# Patient Record
Sex: FEMALE | Race: WHITE | NOT HISPANIC OR LATINO | Employment: STUDENT | URBAN - METROPOLITAN AREA
[De-identification: names, ages, dates, MRNs, and addresses within clinical notes are randomized per-mention and may not be internally consistent; named-entity substitution may affect disease eponyms.]

---

## 2018-11-11 ENCOUNTER — TRANSFERRED RECORDS (OUTPATIENT)
Dept: HEALTH INFORMATION MANAGEMENT | Facility: CLINIC | Age: 23
End: 2018-11-11

## 2019-05-27 ENCOUNTER — APPOINTMENT (OUTPATIENT)
Dept: GENERAL RADIOLOGY | Facility: CLINIC | Age: 24
End: 2019-05-27
Attending: EMERGENCY MEDICINE
Payer: COMMERCIAL

## 2019-05-27 ENCOUNTER — HOSPITAL ENCOUNTER (EMERGENCY)
Facility: CLINIC | Age: 24
Discharge: HOME | End: 2019-05-27
Attending: EMERGENCY MEDICINE | Admitting: EMERGENCY MEDICINE
Payer: COMMERCIAL

## 2019-05-27 VITALS
HEIGHT: 64 IN | HEART RATE: 93 BPM | DIASTOLIC BLOOD PRESSURE: 90 MMHG | OXYGEN SATURATION: 100 % | RESPIRATION RATE: 16 BRPM | SYSTOLIC BLOOD PRESSURE: 128 MMHG | TEMPERATURE: 99.7 F | WEIGHT: 150 LBS | BODY MASS INDEX: 25.61 KG/M2

## 2019-05-27 DIAGNOSIS — S93.401A SPRAIN OF RIGHT ANKLE, UNSPECIFIED LIGAMENT, INITIAL ENCOUNTER: ICD-10-CM

## 2019-05-27 DIAGNOSIS — S93.601A SPRAIN OF RIGHT FOOT, INITIAL ENCOUNTER: ICD-10-CM

## 2019-05-27 PROCEDURE — 73630 X-RAY EXAM OF FOOT: CPT | Mod: RT

## 2019-05-27 PROCEDURE — 73610 X-RAY EXAM OF ANKLE: CPT | Mod: RT

## 2019-05-27 PROCEDURE — 29515 APPLICATION SHORT LEG SPLINT: CPT | Mod: RT

## 2019-05-27 PROCEDURE — 99284 EMERGENCY DEPT VISIT MOD MDM: CPT | Mod: 25

## 2019-05-27 PROCEDURE — 25000132 ZZH RX MED GY IP 250 OP 250 PS 637: Performed by: EMERGENCY MEDICINE

## 2019-05-27 RX ORDER — IBUPROFEN 600 MG/1
600 TABLET, FILM COATED ORAL ONCE
Status: COMPLETED | OUTPATIENT
Start: 2019-05-27 | End: 2019-05-27

## 2019-05-27 RX ADMIN — IBUPROFEN 600 MG: 600 TABLET ORAL at 12:52

## 2019-05-27 ASSESSMENT — MIFFLIN-ST. JEOR: SCORE: 1420.4

## 2019-05-27 ASSESSMENT — ENCOUNTER SYMPTOMS: ARTHRALGIAS: 1
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
## 2019-05-27 NOTE — ED AVS SNAPSHOT
Emergency Department  [address] [place] [address]  Phone:  [phone]  Fax:  [phone]                                    Josh Rodriguez   MRN: [id number]    Department:   Emergency Department   Date of Visit:  5/27/2019           After Visit Summary Signature Page    I have received my discharge instructions, and my questions have been answered. I have discussed any challenges I see with this plan with the nurse or doctor.    ..........................................................................................................................................  Patient/Patient Representative Signature      ..........................................................................................................................................  Patient Representative Print Name and Relationship to Patient    ..................................................               ................................................  Date                                   Time    ..........................................................................................................................................  Reviewed by Signature/Title    ...................................................              ..............................................  Date                                               Time          22EPIC Rev 08/18
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
## 2021-04-29 ENCOUNTER — TRANSFERRED RECORDS (OUTPATIENT)
Dept: HEALTH INFORMATION MANAGEMENT | Facility: CLINIC | Age: 26
End: 2021-04-29
Payer: COMMERCIAL

## 2021-12-05 ENCOUNTER — HEALTH MAINTENANCE LETTER (OUTPATIENT)
Age: 26
End: 2021-12-05

## 2021-12-07 ASSESSMENT — ANXIETY QUESTIONNAIRES
3. WORRYING TOO MUCH ABOUT DIFFERENT THINGS: NEARLY EVERY DAY
7. FEELING AFRAID AS IF SOMETHING AWFUL MIGHT HAPPEN: MORE THAN HALF THE DAYS
GAD7 TOTAL SCORE: 18
5. BEING SO RESTLESS THAT IT IS HARD TO SIT STILL: MORE THAN HALF THE DAYS
4. TROUBLE RELAXING: NEARLY EVERY DAY
2. NOT BEING ABLE TO STOP OR CONTROL WORRYING: MORE THAN HALF THE DAYS
GAD7 TOTAL SCORE: 18
1. FEELING NERVOUS, ANXIOUS, OR ON EDGE: NEARLY EVERY DAY
8. IF YOU CHECKED OFF ANY PROBLEMS, HOW DIFFICULT HAVE THESE MADE IT FOR YOU TO DO YOUR WORK, TAKE CARE OF THINGS AT HOME, OR GET ALONG WITH OTHER PEOPLE?: VERY DIFFICULT
6. BECOMING EASILY ANNOYED OR IRRITABLE: NEARLY EVERY DAY
GAD7 TOTAL SCORE: 18
7. FEELING AFRAID AS IF SOMETHING AWFUL MIGHT HAPPEN: MORE THAN HALF THE DAYS

## 2021-12-07 ASSESSMENT — ENCOUNTER SYMPTOMS
BLOOD IN STOOL: 0
WEIGHT LOSS: 0
CHILLS: 0
DEPRESSION: 1
INSOMNIA: 1
HEARTBURN: 0
NERVOUS/ANXIOUS: 1
JAUNDICE: 0
NIGHT SWEATS: 1
SORE THROAT: 0
INCREASED ENERGY: 1
ABDOMINAL PAIN: 0
WEIGHT GAIN: 1
BLOATING: 1
POLYDIPSIA: 0
POLYPHAGIA: 1
NAIL CHANGES: 0
NAUSEA: 0
DECREASED APPETITE: 0
DECREASED CONCENTRATION: 1
SKIN CHANGES: 0
POOR WOUND HEALING: 0
SINUS CONGESTION: 0
DIARRHEA: 1
BOWEL INCONTINENCE: 0
ALTERED TEMPERATURE REGULATION: 0
VOMITING: 0
PANIC: 1
HOARSE VOICE: 0
FEVER: 0
TROUBLE SWALLOWING: 0
RECTAL PAIN: 1
DECREASED LIBIDO: 1
HOT FLASHES: 1
SINUS PAIN: 0
NECK MASS: 0
SMELL DISTURBANCE: 0
CONSTIPATION: 1
FATIGUE: 1
HALLUCINATIONS: 0
TASTE DISTURBANCE: 0

## 2021-12-19 NOTE — PROGRESS NOTES
REASON for VISIT  establish care/     HPI   Josh Rodriguez is a 25 year old female who is here to establish care. . Has IUD - 9/19/2018  Has Kyleena - no menses since then - occasional spotting - Gr0 -IUD good for 5 yrs.  Sexually active - single partner - Pap was 2017 - no hx of STI's No vaginal discharge or itching    does occasionally get severe sharp pain  In the perineum - maybe every 3 months - on and off for a day - almost like a period cramp - - using ibuprofen  and helps - happens with spotting  - has an IUD     Menarche around age 10 - had very irreg menses - then went on BCP for several years and then changed to IUD.     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  Moved here in July  - using hydoxyzine for sleep and anxiety -     Hx of night sweats - sometimes changes pajamas 2-3 times/night   'FHx factor 5 Leiden disorder  - her sister, father p cousin and PGM are +for factor 5 Leiden disorder      Past Medical History:   Diagnosis Date     Breast disorder 01/2014    Benign mass biopsies in right breast     TRACEY (generalized anxiety disorder)      Migraines 2005     Moderate episode of recurrent major depressive disorder (H)      Seasonal allergic rhinitis          Current Outpatient Medications   Medication Sig Dispense Refill     citalopram (CELEXA) 10 MG tablet Take 10 mg by mouth daily       fluticasone (FLONASE) 50 MCG/ACT nasal spray Spray 1 spray into both nostrils daily as needed for rhinitis or allergies       hydrOXYzine (ATARAX) 25 MG tablet Take 25 mg by mouth 3 times daily as needed for anxiety         Social History     Tobacco Use     Smoking  status: Never Smoker     Smokeless tobacco: Never Used   Vaping Use     Vaping Use: Never used   Substance Use Topics     Alcohol use: Yes     Comment: socially     Drug use: Never       Family History   Problem Relation Age of Onset     Breast Cancer Mother         Triple negative, stage 3, age 40     Osteoporosis Mother      Genetic Disorder Father         Factor IV Leiden     Anxiety Disorder Sister      Genetic Disorder Sister         Factor IV Leiden     Other Cancer Brother         Skin, stage 1     Breast Cancer Maternal Grandmother         ER+, HER2-, age 70     Osteoporosis Maternal Grandmother      Genetic Disorder Paternal Grandmother         Factor IV Leiden     Depression Paternal Grandfather      Genetic Disorder Cousin         Paternal, Factor IV Leiden and PCOS          Review Of Systems  Answers for HPI/ROS submitted by the patient on 12/7/2021  TRACEY 7 TOTAL SCORE: 18  General Symptoms: Yes  Skin Symptoms: Yes  HENT Symptoms: Yes  EYE SYMPTOMS: No  HEART SYMPTOMS: No  LUNG SYMPTOMS: No  INTESTINAL SYMPTOMS: Yes  URINARY SYMPTOMS: No  GYNECOLOGIC SYMPTOMS: Yes  BREAST SYMPTOMS: No  SKELETAL SYMPTOMS: No  BLOOD SYMPTOMS: No  NERVOUS SYSTEM SYMPTOMS: No  MENTAL HEALTH SYMPTOMS: Yes  Ear pain: No  Ear discharge: No  Hearing loss: No  Tinnitus: No  Nosebleeds: No  Congestion: No  Sinus pain: No  Trouble swallowing: No   Voice hoarseness: No  Mouth sores: No  Sore throat: No  Tooth pain: No  Gum tenderness: No  Bleeding gums: No  Change in taste: No  Change in sense of smell: No  Dry mouth: Yes  Hearing aid used: No  Neck lump: No  Fever: No  Loss of appetite: No  Weight loss: No  Weight gain: Yes  Fatigue: Yes  Night sweats: Yes  Chills: No  Increased stress: No  Excessive hunger: Yes  Excessive thirst: No  Feeling hot or cold when others believe the temperature is normal: No  Loss of height: No  Post-operative complications: No  Surgical site pain: No  Hallucinations: No  Change in or Loss of Energy:  "Yes  Hyperactivity: No  Confusion: No  Changes in hair: No  Changes in moles/birth marks: No  Itching: Yes  Rashes: No  Changes in nails: No  Acne: Yes  Hair in places you don't want it: Yes  Change in facial hair: No  Warts: No  Non-healing sores: No  Scarring: No  Flaking of skin: No  Color changes of hands/feet in cold : No  Sun sensitivity: No  Skin thickening: No  Heart burn or indigestion: No  Nausea: No  Vomiting: No  Abdominal pain: No  Bloating: Yes  Constipation: Yes  Diarrhea: Yes  Blood in stool: No  Black stools: No  Rectal or Anal pain: Yes  Fecal incontinence: No  Yellowing of skin or eyes: No  Vomit with blood: No  Change in stools: No  Bleeding or spotting between periods: No  Heavy or painful periods: Yes  Irregular periods: No  Vaginal discharge: No  Hot flashes: Yes  Vaginal dryness: No  Genital ulcers: No  Reduced libido: Yes  Painful intercourse: No  Difficulty with sexual arousal: Yes  Post-menopausal bleeding: No  Nervous or Anxious: Yes  Depression: Yes  Trouble sleeping: Yes  Trouble thinking or concentrating: Yes  Mood changes: Yes  Panic attacks: Yes      OBJECTIVE:  /70 (BP Location: Right arm, Patient Position: Chair)   Pulse 102   Ht 1.626 m (5' 4\")   Wt 93.8 kg (206 lb 12.8 oz)   Breastfeeding No   BMI 35.50 kg/m    Gen:  young woman in NAD  HEENT: PERRL fundi red reflex  Ears clear with good light reflex.  OP mask   Neck  Supple.  Thyroid not palpable  No carotid bruits.  No LAD  CVS exam: S1, S2 normal, no murmur, click, rub or gallop, regular rate and rhythm, chest is clear without rales or wheezing, no pedal edema, no JVD, no hepatosplenomegaly.  Abd Soft ND NT  BS active  No masses or HSM  Ext   Good pulses. No edema  Musculoskeletal: spine is straight, extremities full ROM, no deformity  BREAST:  normal without suspicious masses, skin changes or axillary nodes, symmetric fibrous changes in both upper outer quadrants   Pelvic exam: normal vagina and vulva, normal " cervix without lesions or tenderness,pelvic somewhat limited because pf body habitus,  uterus normal size anteverted, adenexa  without tenderness, pap smear done.  Rectal exam: not done  Neuro: Neurological exam reveals normal without focal findings, mental status, speech normal, alert and oriented x iii, KRISTI, cranial nerves 2-12 intact, muscle tone and strength normal and symmetric, reflexes normal and symmetric  .      ASSESSMENT:young female with hx of anxiety, night sweats, excessive hair growth who comes in to establish care and for a preventative exam.      (Z76.89) Encounter to establish care  (primary encounter diagnosis)  Comment: pap due - she will get back on immunizations record - will get a lipid - BP is borderline systolic - follow   Plan: Lipid Panel, Obtaining, preparing and         conveyance of cervical or vaginal smear to         laboratory., Pap thin layer screen reflex to         HPV if ASCUS - recommended age 25 - 29 years            (F41.1) TRACEY (generalized anxiety disorder)  Comment: TRACEY = 19 - moderate will be titrating citalopram down and prozac up   Plan: FLUoxetine (PROZAC) 10 MG tablet        F/u 1 month         (Z11.3) Routine screening for STI (sexually transmitted infection)  Comment: wants STI testing  Plan: HIV Antigen Antibody Combo, Chlamydia         trachomatis PCR (Clinic Collect), Neisseria         gonorrhoeae PCR, CANCELED: Chlamydia         trachomatis PCR (Clinic Collect), CANCELED:         Neisseria gonorrhoeae PCR            (F33.1) Moderate episode of recurrent major depressive disorder (H)  Comment: PHQ 9 is 17 - no harmful thoughts - says she doesn't feel depressed  Plan: starting prozac again - felt better on this     (L68.9) Excessive hair growth  Comment: possible has PCOS - will get TVUS - if has PCOS would consider adding BCP but has strong FHx factor V Leiden deficiency so couldn't use BCP but will see what test shows.   Plan: Testosterone,  Total, Testosterone          Bioavailable, US Transvaginal Non OB            (R10.2) Pelvic cramping  Comment: would question if this is related to the IUD - does occur with spotting - ? If uterus trying to expel the IUD  Plan: follow    (Z83.2) Family history of factor V deficiency  Comment: prevalent in the FHx - will test - will limit use of OC but has been on BCP in the past   Plan: Factor 5 leiden mutation analysis            (R61) Night sweats  Comment: unclear etiology - suggests vasomotor instability -unlikely lymphoma, possibly TB, will check CBC and TSH and LDH and quantiferon   will check some labs - could possibly be sleep apnea.  Will be getting US   Plan: TSH - Reflex to FT4, CBC with Platelets         Differential, Comprehensive metabolic panel            Daiana Arceo MD, PhD    Time note ((n5, 60'): The total time (on the date of service) for this service was 60 minutes, including discussion/face-to-face, chart review, interpretation not otherwise reported, documentation, and updating of the computerized record.

## 2021-12-20 ENCOUNTER — LAB (OUTPATIENT)
Dept: LAB | Facility: CLINIC | Age: 26
End: 2021-12-20
Attending: FAMILY MEDICINE
Payer: COMMERCIAL

## 2021-12-20 ENCOUNTER — OFFICE VISIT (OUTPATIENT)
Dept: FAMILY MEDICINE | Facility: CLINIC | Age: 26
End: 2021-12-20
Attending: FAMILY MEDICINE
Payer: COMMERCIAL

## 2021-12-20 VITALS
HEIGHT: 64 IN | WEIGHT: 206.8 LBS | SYSTOLIC BLOOD PRESSURE: 130 MMHG | HEART RATE: 102 BPM | DIASTOLIC BLOOD PRESSURE: 70 MMHG | BODY MASS INDEX: 35.3 KG/M2

## 2021-12-20 DIAGNOSIS — Z00.00 ANNUAL PHYSICAL EXAM: ICD-10-CM

## 2021-12-20 DIAGNOSIS — R61 NIGHT SWEATS: ICD-10-CM

## 2021-12-20 DIAGNOSIS — L68.9 EXCESSIVE HAIR GROWTH: ICD-10-CM

## 2021-12-20 DIAGNOSIS — Z83.2 FAMILY HISTORY OF FACTOR V DEFICIENCY: ICD-10-CM

## 2021-12-20 DIAGNOSIS — Z11.3 ROUTINE SCREENING FOR STI (SEXUALLY TRANSMITTED INFECTION): ICD-10-CM

## 2021-12-20 DIAGNOSIS — F33.1 MODERATE EPISODE OF RECURRENT MAJOR DEPRESSIVE DISORDER (H): ICD-10-CM

## 2021-12-20 DIAGNOSIS — R10.2 PELVIC CRAMPING: ICD-10-CM

## 2021-12-20 DIAGNOSIS — F41.1 GAD (GENERALIZED ANXIETY DISORDER): ICD-10-CM

## 2021-12-20 DIAGNOSIS — Z76.89 ENCOUNTER TO ESTABLISH CARE: Primary | ICD-10-CM

## 2021-12-20 LAB
ALBUMIN SERPL-MCNC: 4 G/DL (ref 3.4–5)
ALP SERPL-CCNC: 82 U/L (ref 40–150)
ALT SERPL W P-5'-P-CCNC: 19 U/L (ref 0–50)
ANION GAP SERPL CALCULATED.3IONS-SCNC: 7 MMOL/L (ref 3–14)
AST SERPL W P-5'-P-CCNC: 15 U/L (ref 0–45)
BASOPHILS # BLD AUTO: 0 10E3/UL (ref 0–0.2)
BASOPHILS NFR BLD AUTO: 0 %
BILIRUB SERPL-MCNC: 0.2 MG/DL (ref 0.2–1.3)
BUN SERPL-MCNC: 12 MG/DL (ref 7–30)
CALCIUM SERPL-MCNC: 9.5 MG/DL (ref 8.5–10.1)
CHLORIDE BLD-SCNC: 107 MMOL/L (ref 94–109)
CHOLEST SERPL-MCNC: 129 MG/DL
CO2 SERPL-SCNC: 24 MMOL/L (ref 20–32)
CREAT SERPL-MCNC: 0.61 MG/DL (ref 0.52–1.04)
EOSINOPHIL # BLD AUTO: 0.1 10E3/UL (ref 0–0.7)
EOSINOPHIL NFR BLD AUTO: 1 %
ERYTHROCYTE [DISTWIDTH] IN BLOOD BY AUTOMATED COUNT: 13 % (ref 10–15)
FACTOR V INTERPRETATION: ABNORMAL
FASTING STATUS PATIENT QL REPORTED: NO
GFR SERPL CREATININE-BSD FRML MDRD: >90 ML/MIN/1.73M2
GLUCOSE BLD-MCNC: 89 MG/DL (ref 70–99)
HCT VFR BLD AUTO: 38.4 % (ref 35–47)
HDLC SERPL-MCNC: 57 MG/DL
HGB BLD-MCNC: 12.4 G/DL (ref 11.7–15.7)
IMM GRANULOCYTES # BLD: 0 10E3/UL
IMM GRANULOCYTES NFR BLD: 0 %
LAB DIRECTOR COMMENTS: ABNORMAL
LAB DIRECTOR DISCLAIMER: ABNORMAL
LAB DIRECTOR INTERPRETATION: ABNORMAL
LAB DIRECTOR METHODOLOGY: ABNORMAL
LAB DIRECTOR RESULTS: ABNORMAL
LDLC SERPL CALC-MCNC: 52 MG/DL
LYMPHOCYTES # BLD AUTO: 2.5 10E3/UL (ref 0.8–5.3)
LYMPHOCYTES NFR BLD AUTO: 27 %
MCH RBC QN AUTO: 28.4 PG (ref 26.5–33)
MCHC RBC AUTO-ENTMCNC: 32.3 G/DL (ref 31.5–36.5)
MCV RBC AUTO: 88 FL (ref 78–100)
MONOCYTES # BLD AUTO: 0.5 10E3/UL (ref 0–1.3)
MONOCYTES NFR BLD AUTO: 6 %
NEUTROPHILS # BLD AUTO: 6 10E3/UL (ref 1.6–8.3)
NEUTROPHILS NFR BLD AUTO: 66 %
NONHDLC SERPL-MCNC: 72 MG/DL
NRBC # BLD AUTO: 0 10E3/UL
NRBC BLD AUTO-RTO: 0 /100
PLATELET # BLD AUTO: 401 10E3/UL (ref 150–450)
POTASSIUM BLD-SCNC: 3.6 MMOL/L (ref 3.4–5.3)
PROT SERPL-MCNC: 8.2 G/DL (ref 6.8–8.8)
RBC # BLD AUTO: 4.36 10E6/UL (ref 3.8–5.2)
SODIUM SERPL-SCNC: 138 MMOL/L (ref 133–144)
SPECIMEN DESCRIPTION: ABNORMAL
TRIGL SERPL-MCNC: 98 MG/DL
TSH SERPL DL<=0.005 MIU/L-ACNC: 1.52 MU/L (ref 0.4–4)
WBC # BLD AUTO: 9.1 10E3/UL (ref 4–11)

## 2021-12-20 PROCEDURE — 87389 HIV-1 AG W/HIV-1&-2 AB AG IA: CPT

## 2021-12-20 PROCEDURE — 84403 ASSAY OF TOTAL TESTOSTERONE: CPT

## 2021-12-20 PROCEDURE — 80061 LIPID PANEL: CPT

## 2021-12-20 PROCEDURE — 99205 OFFICE O/P NEW HI 60 MIN: CPT | Performed by: FAMILY MEDICINE

## 2021-12-20 PROCEDURE — 85025 COMPLETE CBC W/AUTO DIFF WBC: CPT

## 2021-12-20 PROCEDURE — G0145 SCR C/V CYTO,THINLAYER,RESCR: HCPCS | Performed by: FAMILY MEDICINE

## 2021-12-20 PROCEDURE — G0452 MOLECULAR PATHOLOGY INTERPR: HCPCS | Mod: 26 | Performed by: PATHOLOGY

## 2021-12-20 PROCEDURE — 84270 ASSAY OF SEX HORMONE GLOBUL: CPT

## 2021-12-20 PROCEDURE — 87491 CHLMYD TRACH DNA AMP PROBE: CPT | Mod: 91 | Performed by: FAMILY MEDICINE

## 2021-12-20 PROCEDURE — 82040 ASSAY OF SERUM ALBUMIN: CPT

## 2021-12-20 PROCEDURE — 84443 ASSAY THYROID STIM HORMONE: CPT

## 2021-12-20 PROCEDURE — 81241 F5 GENE: CPT

## 2021-12-20 PROCEDURE — G0463 HOSPITAL OUTPT CLINIC VISIT: HCPCS | Mod: 25

## 2021-12-20 PROCEDURE — 36415 COLL VENOUS BLD VENIPUNCTURE: CPT

## 2021-12-20 PROCEDURE — 87591 N.GONORRHOEAE DNA AMP PROB: CPT | Performed by: FAMILY MEDICINE

## 2021-12-20 RX ORDER — CITALOPRAM HYDROBROMIDE 10 MG/1
10 TABLET ORAL DAILY
COMMUNITY
End: 2022-06-22

## 2021-12-20 RX ORDER — HYDROXYZINE HYDROCHLORIDE 25 MG/1
25 TABLET, FILM COATED ORAL 3 TIMES DAILY PRN
COMMUNITY
End: 2022-08-16

## 2021-12-20 RX ORDER — FLUTICASONE PROPIONATE 50 MCG
1 SPRAY, SUSPENSION (ML) NASAL DAILY PRN
COMMUNITY

## 2021-12-20 RX ORDER — FLUOXETINE 10 MG/1
TABLET, FILM COATED ORAL
Qty: 49 TABLET | Refills: 1 | Status: SHIPPED | OUTPATIENT
Start: 2021-12-20 | End: 2022-06-22

## 2021-12-20 ASSESSMENT — ANXIETY QUESTIONNAIRES
2. NOT BEING ABLE TO STOP OR CONTROL WORRYING: NEARLY EVERY DAY
5. BEING SO RESTLESS THAT IT IS HARD TO SIT STILL: NEARLY EVERY DAY
7. FEELING AFRAID AS IF SOMETHING AWFUL MIGHT HAPPEN: SEVERAL DAYS
3. WORRYING TOO MUCH ABOUT DIFFERENT THINGS: NEARLY EVERY DAY
GAD7 TOTAL SCORE: 19
1. FEELING NERVOUS, ANXIOUS, OR ON EDGE: NEARLY EVERY DAY
6. BECOMING EASILY ANNOYED OR IRRITABLE: NEARLY EVERY DAY

## 2021-12-20 ASSESSMENT — MIFFLIN-ST. JEOR: SCORE: 1668.04

## 2021-12-20 ASSESSMENT — PATIENT HEALTH QUESTIONNAIRE - PHQ9
5. POOR APPETITE OR OVEREATING: NEARLY EVERY DAY
SUM OF ALL RESPONSES TO PHQ QUESTIONS 1-9: 17

## 2021-12-20 ASSESSMENT — PAIN SCALES - GENERAL: PAINLEVEL: NO PAIN (0)

## 2021-12-20 NOTE — NURSING NOTE
Chief Complaint   Patient presents with     Establish Care     Discuss possible PCOS, anxiety meds, and has family hx of Factor V Leiden and pt would like testing.       See KATHARINE Larson 12/20/2021

## 2021-12-20 NOTE — LETTER
Date:December 21, 2021      Patient was self referred, no letter generated. Do not send.        [facility] Health Information

## 2021-12-20 NOTE — LETTER
12/20/2021       RE: Josh Rodriguez  [address]  [facility] [id number]     Dear Colleague,    Thank you for referring your patient, Josh Rodriguez, to the [facility] [place] at [facility]. Please see a copy of my visit note below.    REASON for VISIT  establish care/     HPI   Josh Rodriguez is a 25 year old female who is here to establish care. . Has IUD - 9/19/2018  Has Kyleena - no menses since then - occasional spotting - Gr0 -IUD good for 5 yrs.  Sexually active - single partner - Pap was 2017 - no hx of STI's No vaginal discharge or itching    does occasionally get severe sharp pain  In the perineum - maybe every 3 months - on and off for a day - almost like a period cramp - - using ibuprofen  and helps - happens with spotting  - has an IUD     Menarche around age 10 - had very irreg menses - then went on BCP for several years and then changed to IUD.     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  Moved here in July  - using hydoxyzine for sleep and anxiety -     Hx of night sweats - sometimes changes pajamas 2-3 times/night   'FHx factor 5 Leiden disorder  - her sister, father p cousin and PGM are +for factor 5 Leiden disorder      Past Medical History:   Diagnosis Date     Breast disorder 01/2014    Benign mass biopsies in right breast     TRACEY (generalized anxiety disorder)      Migraines 2005     Moderate episode of recurrent major depressive disorder (H)      Seasonal allergic rhinitis          Current Outpatient Medications   Medication Sig Dispense Refill     citalopram  (CELEXA) 10 MG tablet Take 10 mg by mouth daily       fluticasone (FLONASE) 50 MCG/ACT nasal spray Spray 1 spray into both nostrils daily as needed for rhinitis or allergies       hydrOXYzine (ATARAX) 25 MG tablet Take 25 mg by mouth 3 times daily as needed for anxiety         Social History     Tobacco Use     Smoking status: Never Smoker     Smokeless tobacco: Never Used   Vaping Use     Vaping Use: Never used   Substance Use Topics     Alcohol use: Yes     Comment: socially     Drug use: Never       Family History   Problem Relation Age of Onset     Breast Cancer Mother         Triple negative, stage 3, age 40     Osteoporosis Mother      Genetic Disorder Father         Factor IV Leiden     Anxiety Disorder Sister      Genetic Disorder Sister         Factor IV Leiden     Other Cancer Brother         Skin, stage 1     Breast Cancer Maternal Grandmother         ER+, HER2-, age 70     Osteoporosis Maternal Grandmother      Genetic Disorder Paternal Grandmother         Factor IV Leiden     Depression Paternal Grandfather      Genetic Disorder Cousin         Paternal, Factor IV Leiden and PCOS          Review Of Systems  Answers for HPI/ROS submitted by the patient on 12/7/2021  TRACEY 7 TOTAL SCORE: 18  General Symptoms: Yes  Skin Symptoms: Yes  HENT Symptoms: Yes  EYE SYMPTOMS: No  HEART SYMPTOMS: No  LUNG SYMPTOMS: No  INTESTINAL SYMPTOMS: Yes  URINARY SYMPTOMS: No  GYNECOLOGIC SYMPTOMS: Yes  BREAST SYMPTOMS: No  SKELETAL SYMPTOMS: No  BLOOD SYMPTOMS: No  NERVOUS SYSTEM SYMPTOMS: No  MENTAL HEALTH SYMPTOMS: Yes  Ear pain: No  Ear discharge: No  Hearing loss: No  Tinnitus: No  Nosebleeds: No  Congestion: No  Sinus pain: No  Trouble swallowing: No   Voice hoarseness: No  Mouth sores: No  Sore throat: No  Tooth pain: No  Gum tenderness: No  Bleeding gums: No  Change in taste: No  Change in sense of smell: No  Dry mouth: Yes  Hearing aid used: No  Neck lump: No  Fever: No  Loss of appetite: No  Weight loss: No  Weight  "gain: Yes  Fatigue: Yes  Night sweats: Yes  Chills: No  Increased stress: No  Excessive hunger: Yes  Excessive thirst: No  Feeling hot or cold when others believe the temperature is normal: No  Loss of height: No  Post-operative complications: No  Surgical site pain: No  Hallucinations: No  Change in or Loss of Energy: Yes  Hyperactivity: No  Confusion: No  Changes in hair: No  Changes in moles/birth marks: No  Itching: Yes  Rashes: No  Changes in nails: No  Acne: Yes  Hair in places you don't want it: Yes  Change in facial hair: No  Warts: No  Non-healing sores: No  Scarring: No  Flaking of skin: No  Color changes of hands/feet in cold : No  Sun sensitivity: No  Skin thickening: No  Heart burn or indigestion: No  Nausea: No  Vomiting: No  Abdominal pain: No  Bloating: Yes  Constipation: Yes  Diarrhea: Yes  Blood in stool: No  Black stools: No  Rectal or Anal pain: Yes  Fecal incontinence: No  Yellowing of skin or eyes: No  Vomit with blood: No  Change in stools: No  Bleeding or spotting between periods: No  Heavy or painful periods: Yes  Irregular periods: No  Vaginal discharge: No  Hot flashes: Yes  Vaginal dryness: No  Genital ulcers: No  Reduced libido: Yes  Painful intercourse: No  Difficulty with sexual arousal: Yes  Post-menopausal bleeding: No  Nervous or Anxious: Yes  Depression: Yes  Trouble sleeping: Yes  Trouble thinking or concentrating: Yes  Mood changes: Yes  Panic attacks: Yes      OBJECTIVE:  /70 (BP Location: Right arm, Patient Position: Chair)   Pulse 102   Ht 1.626 m (5' 4\")   Wt 93.8 kg (206 lb 12.8 oz)   Breastfeeding No   BMI 35.50 kg/m    Gen:  young woman in NAD  HEENT: PERRL fundi red reflex  Ears clear with good light reflex.  OP mask   Neck  Supple.  Thyroid not palpable  No carotid bruits.  No LAD  CVS exam: S1, S2 normal, no murmur, click, rub or gallop, regular rate and rhythm, chest is clear without rales or wheezing, no pedal edema, no JVD, no hepatosplenomegaly.  Abd Soft " ND NT  BS active  No masses or HSM  Ext   Good pulses. No edema  Musculoskeletal: spine is straight, extremities full ROM, no deformity  BREAST:  normal without suspicious masses, skin changes or axillary nodes, symmetric fibrous changes in both upper outer quadrants   Pelvic exam: normal vagina and vulva, normal cervix without lesions or tenderness,pelvic somewhat limited because pf body habitus,  uterus normal size anteverted, adenexa  without tenderness, pap smear done.  Rectal exam: not done  Neuro: Neurological exam reveals normal without focal findings, mental status, speech normal, alert and oriented x iii, KRISTI, cranial nerves 2-12 intact, muscle tone and strength normal and symmetric, reflexes normal and symmetric  .      ASSESSMENT:young female with hx of anxiety, night sweats, excessive hair growth who comes in to establish care and for a preventative exam.      (Z76.89) Encounter to establish care  (primary encounter diagnosis)  Comment: pap due - she will get back on immunizations record - will get a lipid - BP is borderline systolic - follow   Plan: Lipid Panel, Obtaining, preparing and         conveyance of cervical or vaginal smear to         laboratory., Pap thin layer screen reflex to         HPV if ASCUS - recommended age 25 - 29 years            (F41.1) TRACEY (generalized anxiety disorder)  Comment: TRACEY = 19 - moderate will be titrating citalopram down and prozac up   Plan: FLUoxetine (PROZAC) 10 MG tablet        F/u 1 month         (Z11.3) Routine screening for STI (sexually transmitted infection)  Comment: wants STI testing  Plan: HIV Antigen Antibody Combo, Chlamydia         trachomatis PCR (Clinic Collect), Neisseria         gonorrhoeae PCR, CANCELED: Chlamydia         trachomatis PCR (Clinic Collect), CANCELED:         Neisseria gonorrhoeae PCR            (F33.1) Moderate episode of recurrent major depressive disorder (H)  Comment: PHQ 9 is 17 - no harmful thoughts - says she doesn't feel  depressed  Plan: starting prozac again - felt better on this     (L68.9) Excessive hair growth  Comment: possible has PCOS - will get TVUS - if has PCOS would consider adding BCP but has strong FHx factor V Leiden deficiency so couldn't use BCP but will see what test shows.   Plan: Testosterone,  Total, Testosterone         Bioavailable, US Transvaginal Non OB            (R10.2) Pelvic cramping  Comment: would question if this is related to the IUD - does occur with spotting - ? If uterus trying to expel the IUD  Plan: follow    (Z83.2) Family history of factor V deficiency  Comment: prevalent in the FHx - will test - will limit use of OC but has been on BCP in the past   Plan: Factor 5 leiden mutation analysis            (R61) Night sweats  Comment: unclear etiology - suggests vasomotor instability - will check some labs  Plan: TSH - Reflex to FT4, CBC with Platelets         Differential, Comprehensive metabolic panel            Daiana Arceo MD, PhD    Time note ((n5, 60'): The total time (on the date of service) for this service was 60 minutes, including discussion/face-to-face, chart review, interpretation not otherwise reported, documentation, and updating of the computerized record.        Again, thank you for allowing me to participate in the care of your patient.      Sincerely,    Daiana Arceo MD PhD

## 2021-12-20 NOTE — PATIENT INSTRUCTIONS
Blood work today  Get transvaginal us  Titrate up on prozac  See me 1 month   May start birth control pills to control hair growth and suppress ovaries - will get blood work and TVUS first

## 2021-12-21 LAB
C TRACH DNA SPEC QL NAA+PROBE: NEGATIVE
HIV 1+2 AB+HIV1 P24 AG SERPL QL IA: NONREACTIVE
N GONORRHOEA DNA SPEC QL NAA+PROBE: NEGATIVE
SHBG SERPL-SCNC: 31 NMOL/L (ref 30–135)

## 2021-12-22 LAB
BKR LAB AP GYN ADEQUACY: NORMAL
BKR LAB AP GYN INTERPRETATION: NORMAL
BKR LAB AP HPV REFLEX: NORMAL
BKR LAB AP PREVIOUS ABNORMAL: NORMAL
PATH REPORT.COMMENTS IMP SPEC: NORMAL
PATH REPORT.COMMENTS IMP SPEC: NORMAL
PATH REPORT.RELEVANT HX SPEC: NORMAL
TESTOST FREE SERPL-MCNC: 0.48 NG/DL
TESTOST SERPL-MCNC: 21 NG/DL (ref 8–60)
TESTOST SERPL-MCNC: 26 NG/DL (ref 8–60)

## 2022-01-25 ENCOUNTER — LAB REQUISITION (OUTPATIENT)
Dept: LAB | Facility: CLINIC | Age: 27
End: 2022-01-25

## 2022-01-25 LAB — SARS-COV-2 RNA RESP QL NAA+PROBE: NEGATIVE

## 2022-01-25 PROCEDURE — U0005 INFEC AGEN DETEC AMPLI PROBE: HCPCS | Performed by: INTERNAL MEDICINE

## 2022-01-25 NOTE — PROGRESS NOTES
Assessment & Plan     Anxiety  Doing better on prozac - wants capsule not tablet - continue with this   - FLUoxetine (PROZAC) 20 MG capsule  Dispense: 90 capsule; Refill: 3    Persistent insomnia  This has been long standing - talked about doing a quiet activity if she can't sleep  - given her depression she wanted to try trazadone  - traZODone (DESYREL) 50 MG tablet  Dispense: 90 tablet; Refill: 1    Encounter for IUD removal  We discussed other BC methods - she plans to use condoms - IUD was removed without complication   - REMOVE INTRAUTERINE DEVICE    Night sweats  Unclear etiology getting better - will still get LDH when her insurance card comes    Mild episode of recurrent major depressive disorder (H)  PHQ 9 has improved     Return in about 3 months (around 4/26/2022).    Daiana Arceo MD PhD  [facility] [place]    Time note (e4, 30'): The total time (on the date of service) for this service was 30 minutes, including discussion/face-to-face, chart review, interpretation not otherwise reported, documentation, and updating of the computerized record.  IUD was removed by me     Kylah Moreno is a 26 year old who presents for the following health issues anxiety, insomnia, IUD removal     HPI She was seen 12/2021 for multiple problems.  Comes back in f/u    She has TRACEY and was switching from citalopram to prozac. - her TRACEY was 19, her PHQ9 was 17 -  She has been on 20mg of prozac/day now  for 2 wks.  Feels better in the day - but anxiety seems to increase at night when  trying to sleep.  Overall less tired and more energy.      Has insomnia longstanding - using white noise, tries to decrease screen time.  Using hydroxzine occasionally.  Tried melatonin in the past but gave her wacky dreams.     She also had night sweats  Of questionable etiology - unlikely lymphoma, possibly TB, will check CBC and TSH and LDH and quantiferon   will check some labs - could possibly be  "sleep apnea. All the labs were not done because changed insurance.  Had TB test 7/2021 and negative so won't get quantiferon. Have got a little better.      Has an IUD - wants it removed.  Has a Mirena - placed 3 yrs ago.  Has cramping with it intermittently  - 2 times now during intercourse -  Occasional spotting.  Plans to use condoms.      Has a mole on back - has gotten wider and now crusty on top - no hx skin cancer  Wants it checked     ROS  Constitutional, HEENT, cardiovascular, pulmonary, GI, , musculoskeletal, neuro, skin, endocrine and psych systems are negative, except as otherwise noted.      Objective    /79 (BP Location: Right arm, Patient Position: Sitting, Cuff Size: Adult Regular)   Pulse 75   Resp 16   Ht 1.626 m (5' 4\")   Wt 93 kg (205 lb)   SpO2 96%   BMI 35.19 kg/m    Body mass index is 35.19 kg/m .  Physical Exam   GENERAL: healthy, alert and no distress   (female): normal female external genitalia, normal urethral meatus, vaginal mucosa, normal cervix/adnexa/uterus without masses or discharge  Skin - scattered macular papular brown colored moles on posterior thorax     TRACEY 11  PHQ9 = 6         Procedure  IUD removal  Speculum placed  cervix and IUD string visible   Cervix wiped with 3 betadine preps  Fine tooth tenaculum placed at 10 and 2   Ring forceps used to pull out IUD without any difficulty  Pressure for minimal bleeding  Pt informed to call if fever or chills or heavy bleeding - or foul vaginal discahrge.    <3cc blood loss - pain tolerance (1-10) was a 3-4 .      Cervix was visualized - wiped with aseptic.  Pt tolerated the procedure well.   Daiana Arceo MD, PhD    "

## 2022-01-26 ENCOUNTER — OFFICE VISIT (OUTPATIENT)
Dept: FAMILY MEDICINE | Facility: CLINIC | Age: 27
End: 2022-01-26
Payer: COMMERCIAL

## 2022-01-26 VITALS
HEIGHT: 64 IN | SYSTOLIC BLOOD PRESSURE: 114 MMHG | BODY MASS INDEX: 35 KG/M2 | RESPIRATION RATE: 16 BRPM | DIASTOLIC BLOOD PRESSURE: 79 MMHG | OXYGEN SATURATION: 96 % | WEIGHT: 205 LBS | HEART RATE: 75 BPM

## 2022-01-26 DIAGNOSIS — F41.9 ANXIETY: Primary | ICD-10-CM

## 2022-01-26 DIAGNOSIS — Z30.432 ENCOUNTER FOR IUD REMOVAL: ICD-10-CM

## 2022-01-26 DIAGNOSIS — F33.0 MILD EPISODE OF RECURRENT MAJOR DEPRESSIVE DISORDER (H): ICD-10-CM

## 2022-01-26 DIAGNOSIS — R61 NIGHT SWEATS: ICD-10-CM

## 2022-01-26 DIAGNOSIS — G47.00 PERSISTENT INSOMNIA: ICD-10-CM

## 2022-01-26 PROCEDURE — 99214 OFFICE O/P EST MOD 30 MIN: CPT | Mod: 25 | Performed by: FAMILY MEDICINE

## 2022-01-26 PROCEDURE — 58301 REMOVE INTRAUTERINE DEVICE: CPT | Performed by: FAMILY MEDICINE

## 2022-01-26 RX ORDER — TRAZODONE HYDROCHLORIDE 50 MG/1
50 TABLET, FILM COATED ORAL AT BEDTIME
Qty: 90 TABLET | Refills: 1 | Status: SHIPPED | OUTPATIENT
Start: 2022-01-26 | End: 2022-06-22

## 2022-01-26 ASSESSMENT — ANXIETY QUESTIONNAIRES
GAD7 TOTAL SCORE: 11
6. BECOMING EASILY ANNOYED OR IRRITABLE: MORE THAN HALF THE DAYS
1. FEELING NERVOUS, ANXIOUS, OR ON EDGE: MORE THAN HALF THE DAYS
2. NOT BEING ABLE TO STOP OR CONTROL WORRYING: SEVERAL DAYS
5. BEING SO RESTLESS THAT IT IS HARD TO SIT STILL: SEVERAL DAYS
7. FEELING AFRAID AS IF SOMETHING AWFUL MIGHT HAPPEN: NOT AT ALL
3. WORRYING TOO MUCH ABOUT DIFFERENT THINGS: MORE THAN HALF THE DAYS
IF YOU CHECKED OFF ANY PROBLEMS ON THIS QUESTIONNAIRE, HOW DIFFICULT HAVE THESE PROBLEMS MADE IT FOR YOU TO DO YOUR WORK, TAKE CARE OF THINGS AT HOME, OR GET ALONG WITH OTHER PEOPLE: SOMEWHAT DIFFICULT

## 2022-01-26 ASSESSMENT — PAIN SCALES - GENERAL: PAINLEVEL: NO PAIN (0)

## 2022-01-26 ASSESSMENT — MIFFLIN-ST. JEOR: SCORE: 1654.87

## 2022-01-26 ASSESSMENT — PATIENT HEALTH QUESTIONNAIRE - PHQ9
5. POOR APPETITE OR OVEREATING: NEARLY EVERY DAY
SUM OF ALL RESPONSES TO PHQ QUESTIONS 1-9: 6

## 2022-01-26 NOTE — NURSING NOTE
Josh Rodriguez is a 26 year old female patient that presents today in clinic for the following:    Chief Complaint   Patient presents with     RECHECK     Patient comes for a follow up.      The patient's allergies and medications were reviewed as noted. A set of vitals were recorded as noted without incident. The patient does not have any other questions for the provider.    Andrew Estrada, EMT at 3:50 PM on 1/26/2022

## 2022-01-27 ENCOUNTER — TELEPHONE (OUTPATIENT)
Dept: FAMILY MEDICINE | Facility: CLINIC | Age: 27
End: 2022-01-27
Payer: COMMERCIAL

## 2022-01-27 ASSESSMENT — ANXIETY QUESTIONNAIRES: GAD7 TOTAL SCORE: 11

## 2022-01-27 NOTE — TELEPHONE ENCOUNTER
Called and spoke with patient about 3mo f/u per provider, she did not have her work schedule that far in advance so she will give [facility] a callback in order to schedule.

## 2022-04-23 NOTE — PATIENT INSTRUCTIONS
No intercourse for 4-5 days  Call if you get fever, chills, foul smelling discharge  Continue on prozac  Try trazadone for sleep   See me in 3 months    Awake/Alert

## 2022-06-22 ENCOUNTER — OFFICE VISIT (OUTPATIENT)
Dept: URGENT CARE | Facility: URGENT CARE | Age: 27
End: 2022-06-22
Payer: COMMERCIAL

## 2022-06-22 VITALS
TEMPERATURE: 99.9 F | DIASTOLIC BLOOD PRESSURE: 79 MMHG | OXYGEN SATURATION: 100 % | SYSTOLIC BLOOD PRESSURE: 127 MMHG | HEART RATE: 89 BPM | WEIGHT: 194.2 LBS | BODY MASS INDEX: 33.33 KG/M2

## 2022-06-22 DIAGNOSIS — B37.31 YEAST VAGINITIS: Primary | ICD-10-CM

## 2022-06-22 DIAGNOSIS — N76.0 VAGINITIS AND VULVOVAGINITIS: ICD-10-CM

## 2022-06-22 PROCEDURE — 99213 OFFICE O/P EST LOW 20 MIN: CPT | Performed by: FAMILY MEDICINE

## 2022-06-22 RX ORDER — FLUCONAZOLE 150 MG/1
150 TABLET ORAL ONCE
Qty: 2 TABLET | Refills: 0 | Status: SHIPPED | OUTPATIENT
Start: 2022-06-22 | End: 2022-06-22

## 2022-06-22 RX ORDER — CEPHALEXIN 500 MG/1
500 CAPSULE ORAL 3 TIMES DAILY
Qty: 21 CAPSULE | Refills: 0 | Status: SHIPPED | OUTPATIENT
Start: 2022-06-22 | End: 2022-06-29

## 2022-06-23 NOTE — PROGRESS NOTES
Chief complaint: lump    Noticed a lump on the base of the vaginal area   Was itchy for a couple of days   Same size   First noticed 3 days ago  Irritated   [place] when she touches   No known std exposure  Recent std screening gc/ct negative in 12/21 same partner     Patient recently came back from a trip 2 weeks ago beach    Dysuria urgency or frequency or UTI symptoms: No     Problem list and histories reviewed & adjusted, as indicated.  Additional history: as documented    Problem list, Medication list, Allergies, and Medical/Social/Surgical [facility] and updated as appropriate.    ROS:  Constitutional, HEENT, cardiovascular, pulmonary, gi and gu systems are negative, except as otherwise noted.    OBJECTIVE:                                                    /79   Pulse 89   Temp 99.9  F (37.7  C) (Tympanic)   Wt 88.1 kg (194 lb 3.2 oz)   LMP 05/25/2022 (Exact Date)   SpO2 100%   BMI 33.33 kg/m    Body mass index is 33.33 kg/m .  GENERAL: healthy, alert and no distress  EYES: Eyes grossly normal to inspection   (female): female external genitalia appeared   Mild erythema  Whitish discharge  Right labia small 5mm cystic swelling   , normal urethral meatus, vaginal mucosa,  MS: no gross musculoskeletal defects noted, no edema  SKIN: no suspicious lesions or rashes  NEURO: Normal strength and tone, mentation intact and speech normal  PSYCH: mentation appears normal, affect normal/bright    Diagnostic Test Results:  No results found for this or any previous visit (from the past 24 hour(s)).     ASSESSMENT/PLAN:                                                        ICD-10-CM    1. Yeast vaginitis  B37.3 fluconazole (DIFLUCAN) 150 MG tablet     cephALEXin (KEFLEX) 500 MG capsule   2. Vaginitis and vulvovaginitis  N76.0 fluconazole (DIFLUCAN) 150 MG tablet     cephALEXin (KEFLEX) 500 MG capsule     Cyst - possibly just inflammed. Warm compresses but start keflex if symptoms persist or asap  if worsening  Clinical suspicion for yeast vaginitis as well recommend empiric treatment    Recommend follow up with primary care provider if no relief in 3 days, sooner if worse  Adverse reactions of medications discussed.  Over the counter medications discussed.   Aware to come back in if with worsening symptoms or if no relief despite treatment plan  Patient voiced understanding and had no further questions.     Maddie Camargo MD    See Patient Instructions    Maddie Camargo MD  [facility]

## 2022-08-12 DIAGNOSIS — F41.9 ANXIETY: Primary | ICD-10-CM

## 2022-08-16 RX ORDER — HYDROXYZINE HYDROCHLORIDE 25 MG/1
25 TABLET, FILM COATED ORAL 2 TIMES DAILY PRN
Qty: 60 TABLET | Refills: 5 | Status: SHIPPED | OUTPATIENT
Start: 2022-08-16

## 2022-08-16 NOTE — TELEPHONE ENCOUNTER
"hydrOXYzine (ATARAX) 25 MG tablet      Last Written Prescription Date:  *Historical     Last Office Visit : 1/26/2022  *Established care 12/20/2021  [facility]  Future Office visit:  none    Routing refill request to provider for review/approval because:  Medication/hydrOXYzine (ATARAX) 25 MG tablet is reported/historical.  - per last visit 1/26/2022 \"uses hydroxyzine occasionally\"  "

## 2022-09-18 ENCOUNTER — HEALTH MAINTENANCE LETTER (OUTPATIENT)
Age: 27
End: 2022-09-18

## 2022-10-03 ENCOUNTER — E-VISIT (OUTPATIENT)
Dept: URGENT CARE | Facility: CLINIC | Age: 27
End: 2022-10-03
Payer: COMMERCIAL

## 2022-10-03 ENCOUNTER — OFFICE VISIT (OUTPATIENT)
Dept: URGENT CARE | Facility: URGENT CARE | Age: 27
End: 2022-10-03

## 2022-10-03 VITALS
DIASTOLIC BLOOD PRESSURE: 81 MMHG | WEIGHT: 187.8 LBS | RESPIRATION RATE: 20 BRPM | SYSTOLIC BLOOD PRESSURE: 115 MMHG | TEMPERATURE: 99 F | OXYGEN SATURATION: 97 % | HEART RATE: 84 BPM | BODY MASS INDEX: 32.24 KG/M2

## 2022-10-03 DIAGNOSIS — J20.9 ACUTE BRONCHITIS WITH SYMPTOMS > 10 DAYS: Primary | ICD-10-CM

## 2022-10-03 DIAGNOSIS — R05.9 COUGH, UNSPECIFIED TYPE: Primary | ICD-10-CM

## 2022-10-03 PROCEDURE — 99213 OFFICE O/P EST LOW 20 MIN: CPT | Performed by: EMERGENCY MEDICINE

## 2022-10-03 PROCEDURE — 99207 PR NON-BILLABLE SERV PER CHARTING: CPT | Performed by: FAMILY MEDICINE

## 2022-10-03 RX ORDER — AZITHROMYCIN 250 MG/1
TABLET, FILM COATED ORAL
Qty: 6 TABLET | Refills: 0 | Status: SHIPPED | OUTPATIENT
Start: 2022-10-03 | End: 2022-10-08

## 2022-10-03 RX ORDER — BENZONATATE 100 MG/1
100 CAPSULE ORAL 2 TIMES DAILY PRN
Qty: 20 CAPSULE | Refills: 0 | Status: SHIPPED | OUTPATIENT
Start: 2022-10-03

## 2022-10-03 NOTE — PATIENT INSTRUCTIONS
Dear Josh Rodriguez,    We are sorry you are not feeling well. Based on the responses you provided, it is recommended that you be seen in-person in urgent care so we can better evaluate your symptoms. Please click here to find the nearest urgent care location to you.   You will not be charged for this Visit. Thank you for trusting us with your care.    Rachell Bradshaw MD

## 2022-10-03 NOTE — PROGRESS NOTES
Assessment & Plan     Diagnosis:    (J20.9) Acute bronchitis with symptoms > 10 days  (primary encounter diagnosis)**  Plan: budesonide (PULMICORT FLEXHALER) 90 MCG/ACT         inhaler, azithromycin (ZITHROMAX) 250 MG         tablet, benzonatate (TESSALON) 100 MG capsule      Medical Decision Making:  Josh Rodriguez is a 26 year old female who presents to clinic today for evaluation of facial pain/pressure.  Signs and symptoms are consistent with bronchitis.   Given clear lungs, no fever, no hypoxia and no respiratory distress I do not feel the patient needs a CXR at this point as the probability of bacterial pneumonia is very unlikely.  There are no signs of complications of bronchitis at this point such as septic shock, bacteremia, empyema, hypoxia, respiratory failure or compromise. No hx of seasonal allergies to suggest allergic rhinitis. Given duration >10 days I discussed antibiotics for bronchitis as well as medications for symptomatic management. Outpatient medications ordered as noted above. There is no signs at this point of serious bacterial infection such as OM, RPA, epiglottitis, PTA, strep pharyngitis, pneumonia, meningitis, bacteremia.    Supportive outpatient management indicated.  Patient verbalizes understanding and agreement with the plan including reasons to go to the ER. All questions answered.      ELOY Mckeon [facility]    Subjective     Josh Rodriguez is a 26 year old female who presents to clinic today for the following health issues:  Chief Complaint   Patient presents with     Sinus Problem     Cough     Dry        HPI    URI Adult    Onset of symptoms was 2 week(s) ago.  Course of illness is same.    Severity moderate  Current and Associated symptoms: cough - non-productive, shortness of breath, sore throat, body aches and fatigue. States that her chest hurts from a persistent coughing.  Treatment measures tried include Tylenol/Ibuprofen, Decongestants and OTC  Cough med.  Predisposing factors include None. No smoking history.    Patient denies any fever, chest pain at rest, leg swelling, nausea, vomiting diarrhea, facial pain or pressure, ear pain, headaches.     Review of Systems    See HPI    Objective      Vitals: /81   Pulse 84   Temp 99  F (37.2  C) (Tympanic)   Resp 20   Wt 85.2 kg (187 lb 12.8 oz)   SpO2 97%   BMI 32.24 kg/m        Patient Vitals for the past 24 hrs:   BP Temp Temp src Pulse Resp SpO2 Weight   10/03/22 1648 115/81 99  F (37.2  C) Tympanic 84 20 97 % 85.2 kg (187 lb 12.8 oz)       Vital signs reviewed by: Abel Forrester PA-C    Physical Exam   Constitutional: Patient is alert and cooperative. No acute distress.  HENT:   Right Ear: External ear normal. TM is normal.  Left Ear: External ear normal. TM is normal.  Nose: Nose is normal. Clear rhinorrhea.   Mouth: Normal tongue and tonsil. Posterior oropharynx is clear.  Eyes: Conjunctivae, EOMI and lids are normal. PERRL.  Cardiovascular: Regular rate and rhythm  Pulmonary/Chest: Lungs are clear to auscultation throughout. Effort normal. No respiratory distress. No wheezes, rales or rhonchi. Frequent cough noted.   GI: Abdomen is soft and non-tender throughout.  Neurological: Alert and oriented x3.  Musculoskeletal: Normal range of motion. Normal tone.  Skin: No rash noted on visualized skin.  Psychiatric:The patient has a normal mood and affect.       Abel Forrseter PA-C, October 3, 2022

## 2022-10-08 DIAGNOSIS — J45.40 MODERATE PERSISTENT REACTIVE AIRWAY DISEASE WITHOUT COMPLICATION: Primary | ICD-10-CM

## 2022-10-08 RX ORDER — PREDNISONE 20 MG/1
20 TABLET ORAL 2 TIMES DAILY
Qty: 10 TABLET | Refills: 0 | Status: SHIPPED | OUTPATIENT
Start: 2022-10-08

## 2022-10-08 NOTE — PROGRESS NOTES
10-8-22 phone call pt having persistent cough - no wheezing - steroid inhaler not helping - has had symptoms >10 days.  No hx of asthma - has had reactive airway in the past - asking for steroids - pt was offered to be seen on Monday - prefers to try steroids - sent script for prednisone 20mg bid for 5 days.  Daiana Arceo MD, PhD

## 2023-01-29 ENCOUNTER — HEALTH MAINTENANCE LETTER (OUTPATIENT)
Age: 28
End: 2023-01-29

## 2024-01-29 ENCOUNTER — E-VISIT (OUTPATIENT)
Dept: URGENT CARE | Facility: CLINIC | Age: 29
End: 2024-01-29
Payer: COMMERCIAL

## 2024-01-29 DIAGNOSIS — R05.2 SUBACUTE COUGH: Primary | ICD-10-CM

## 2024-01-29 PROCEDURE — 99207 PR NON-BILLABLE SERV PER CHARTING: CPT | Performed by: EMERGENCY MEDICINE

## 2024-01-29 RX ORDER — PREDNISONE 50 MG/1
50 TABLET ORAL DAILY
Qty: 5 TABLET | Refills: 0 | Status: SHIPPED | OUTPATIENT
Start: 2024-01-29 | End: 2024-02-03

## 2024-01-29 RX ORDER — AZITHROMYCIN 250 MG/1
TABLET, FILM COATED ORAL
Qty: 6 TABLET | Refills: 0 | Status: SHIPPED | OUTPATIENT
Start: 2024-01-29 | End: 2024-02-03

## 2024-01-29 RX ORDER — ALBUTEROL SULFATE 90 UG/1
2 AEROSOL, METERED RESPIRATORY (INHALATION) EVERY 4 HOURS PRN
Qty: 18 G | Refills: 0 | Status: SHIPPED | OUTPATIENT
Start: 2024-01-29

## 2024-02-25 ENCOUNTER — HEALTH MAINTENANCE LETTER (OUTPATIENT)
Age: 29
End: 2024-02-25

## 2024-11-04 ASSESSMENT — ANXIETY QUESTIONNAIRES: GAD7 TOTAL SCORE: 19

## 2025-03-15 ENCOUNTER — HEALTH MAINTENANCE LETTER (OUTPATIENT)
Age: 30
End: 2025-03-15